# Patient Record
Sex: FEMALE | Employment: FULL TIME | URBAN - METROPOLITAN AREA
[De-identification: names, ages, dates, MRNs, and addresses within clinical notes are randomized per-mention and may not be internally consistent; named-entity substitution may affect disease eponyms.]

---

## 2017-01-26 ENCOUNTER — TELEPHONE (OUTPATIENT)
Dept: RHEUMATOLOGY | Facility: CLINIC | Age: 37
End: 2017-01-26

## 2017-01-26 NOTE — TELEPHONE ENCOUNTER
Pt states her pharmacy is requesting a prior authorization, due to change of insurance. Pt states she is new to pharmacy. Pt has apt tomorrow 1/27   Pt states she needs it asap. Please advise. Verified pharmacy.      Current outpatient prescriptions:   •

## 2017-01-26 NOTE — TELEPHONE ENCOUNTER
Please see message regarding prior authorization due to change of insurance   ENBREL SURECLICK 93LH ml SUBCUTANEOUS SOLUTION AUTO INJECTOR 50mg SUBCUTANEOUSLY ONCE A WEEK   Patient has appt. Tomorrow 1/27     Patient states she needs it asap.  Patient James Yoo

## 2017-01-27 ENCOUNTER — LAB ENCOUNTER (OUTPATIENT)
Dept: LAB | Facility: HOSPITAL | Age: 37
End: 2017-01-27
Attending: INTERNAL MEDICINE
Payer: COMMERCIAL

## 2017-01-27 ENCOUNTER — OFFICE VISIT (OUTPATIENT)
Dept: RHEUMATOLOGY | Facility: CLINIC | Age: 37
End: 2017-01-27

## 2017-01-27 VITALS
WEIGHT: 95.88 LBS | SYSTOLIC BLOOD PRESSURE: 100 MMHG | DIASTOLIC BLOOD PRESSURE: 62 MMHG | HEIGHT: 56 IN | HEART RATE: 92 BPM | BODY MASS INDEX: 21.57 KG/M2

## 2017-01-27 DIAGNOSIS — M06.9 RHEUMATOID ARTHRITIS INVOLVING MULTIPLE SITES, UNSPECIFIED RHEUMATOID FACTOR PRESENCE: ICD-10-CM

## 2017-01-27 DIAGNOSIS — M06.9 RHEUMATOID ARTHRITIS INVOLVING MULTIPLE SITES, UNSPECIFIED RHEUMATOID FACTOR PRESENCE: Primary | ICD-10-CM

## 2017-01-27 DIAGNOSIS — Z51.81 THERAPEUTIC DRUG MONITORING: ICD-10-CM

## 2017-01-27 LAB
ALT SERPL-CCNC: 14 U/L (ref 14–54)
AST SERPL-CCNC: 17 U/L (ref 15–41)
BASOPHILS # BLD: 0 K/UL (ref 0–0.2)
BASOPHILS NFR BLD: 0 %
CREAT SERPL-MCNC: 0.55 MG/DL (ref 0.5–1.5)
CRP SERPL HS-MCNC: 19.4 MG/L (ref 0–7.5)
EOSINOPHIL # BLD: 0.2 K/UL (ref 0–0.7)
EOSINOPHIL NFR BLD: 2 %
ERYTHROCYTE [DISTWIDTH] IN BLOOD BY AUTOMATED COUNT: 15.2 % (ref 11–15)
ERYTHROCYTE [SEDIMENTATION RATE] IN BLOOD: 37 MM/HR (ref 0–20)
HCT VFR BLD AUTO: 39 % (ref 35–48)
HGB BLD-MCNC: 12.6 G/DL (ref 12–16)
LYMPHOCYTES # BLD: 2.4 K/UL (ref 1–4)
LYMPHOCYTES NFR BLD: 27 %
MCH RBC QN AUTO: 28.1 PG (ref 27–32)
MCHC RBC AUTO-ENTMCNC: 32.2 G/DL (ref 32–37)
MCV RBC AUTO: 87.4 FL (ref 80–100)
MONOCYTES # BLD: 1 K/UL (ref 0–1)
MONOCYTES NFR BLD: 11 %
NEUTROPHILS # BLD AUTO: 5.3 K/UL (ref 1.8–7.7)
NEUTROPHILS NFR BLD: 60 %
PLATELET # BLD AUTO: 286 K/UL (ref 140–400)
PMV BLD AUTO: 8.1 FL (ref 7.4–10.3)
RBC # BLD AUTO: 4.47 M/UL (ref 3.7–5.4)
WBC # BLD AUTO: 8.9 K/UL (ref 4–11)

## 2017-01-27 PROCEDURE — 84450 TRANSFERASE (AST) (SGOT): CPT

## 2017-01-27 PROCEDURE — 99214 OFFICE O/P EST MOD 30 MIN: CPT | Performed by: INTERNAL MEDICINE

## 2017-01-27 PROCEDURE — 84460 ALANINE AMINO (ALT) (SGPT): CPT

## 2017-01-27 PROCEDURE — 36415 COLL VENOUS BLD VENIPUNCTURE: CPT

## 2017-01-27 PROCEDURE — 85025 COMPLETE CBC W/AUTO DIFF WBC: CPT

## 2017-01-27 PROCEDURE — 99212 OFFICE O/P EST SF 10 MIN: CPT | Performed by: INTERNAL MEDICINE

## 2017-01-27 PROCEDURE — 82565 ASSAY OF CREATININE: CPT

## 2017-01-27 PROCEDURE — 86141 C-REACTIVE PROTEIN HS: CPT

## 2017-01-27 PROCEDURE — 85652 RBC SED RATE AUTOMATED: CPT

## 2017-01-27 NOTE — TELEPHONE ENCOUNTER
Insurance in formation not updated unable to complete PA at this time. Will complete PA when pt comes to office today for F/U appointment with insurance information.

## 2017-01-27 NOTE — PATIENT INSTRUCTIONS
1. Check labs  -due every 3-4 moths   2. sulfasalazine  1500mg twice a day  3. Cont.  meloxicam 15mg  aday as needed. 4.  plaquenil - hydroxychlrquine 200mg - take 1 1/2 tablets - for your weight -   5. Stay on enbrel 50mg a week   6.   Prednisone 5mg a

## 2017-01-27 NOTE — PROGRESS NOTES
Leilani Beltran is a 29year old female who presents for Patient presents with:  Rheumatoid Arthritis  Wrist Pain: bilateral  .   HPI:     She is a pleasant 29year old who has been haviing seropositive RA - borderline pos RF and pos. CCP abs.   She is here for f feels that the palquenil is helped. But the last few weeks sheh feels it's harder to move around. Today her wrists and her hands are weaker. It's in the past few weeks. She has 8/10 pain in the last 2 weeks. Before that it went down to 5/10.    She feels sh tylenol. She is not taking ibuprofen. She is also on ssz 1500mg bid and hcq 200mg bid. She's also on enbrel 50mg a week. Greg Goetz She wants to try having children again. 1/27/2017 -   She's had ha few days where she has not had bad pain like 1/10.    She fe pleasant, no acute distress, no CAD, no neck tendnerness, good ROM,   No rashes  CVS: RRR, no murmurs  RS: CTAB, no crackles, no rhonchi  ABD: Soft Non tender,   Joint exam:   B/l 2nda dn 3rd mcps not tender - chronci fullness.    Right wrist mild  swellign C-REACTIVE PROTEIN      0.0-0.9 mg/dL 4.2 (H)   SED RATE      0-20 mm/Hr 54 (H)         ASSESSMENT AND PLAN:   Malika Santana is a 29year old female who presents for Patient presents with:  Rheumatoid Arthritis  Wrist Pain: bilateral      1.  Early seropositi

## 2017-01-30 RX ORDER — ETANERCEPT 50 MG/ML
50 SOLUTION SUBCUTANEOUS
Qty: 12 ML | Refills: 1 | Status: SHIPPED | OUTPATIENT
Start: 2017-01-30

## 2017-02-22 RX ORDER — MELOXICAM 15 MG/1
TABLET ORAL
Qty: 30 TABLET | Refills: 6 | Status: SHIPPED | OUTPATIENT
Start: 2017-02-22

## 2017-04-06 RX ORDER — PREDNISONE 1 MG/1
TABLET ORAL
Qty: 90 TABLET | Refills: 1 | Status: SHIPPED | OUTPATIENT
Start: 2017-04-06

## 2017-05-08 RX ORDER — HYDROXYCHLOROQUINE SULFATE 200 MG/1
TABLET, FILM COATED ORAL
Qty: 45 TABLET | Refills: 5 | Status: SHIPPED | OUTPATIENT
Start: 2017-05-08

## 2017-05-08 NOTE — TELEPHONE ENCOUNTER
LOV:1-27  Last Filled:10-14, #45 with 6 refills  Labs:per LOV notes was told to do eye exam every year. No eye exam noted  No future appointments.     Please Advise

## 2017-11-04 NOTE — TELEPHONE ENCOUNTER
LOV:1-27  Last Filled:10-14, #90 with 1 refill  Labs:  No future appointments.     Please Advise Alert and oriented, no focal deficits, no motor or sensory deficits.

## (undated) NOTE — MR AVS SNAPSHOT
[address] [address]  [phone]               Thank you for choosing us for your health care visit with Juan Ge MD.  We are glad to serve you and happy to provide you with this albrecht Today's Orders     AST (SGOT) [822] [Q]    Expires on:  Jun 27, 2017    Assoc Dx:  Rheumatoid arthritis involving multiple sites, unspecified rheumatoid factor presence ([facility] [M06.9], Therapeutic drug monitoring [Z51.81]           ALT (SGPT) [823] [Q]    E Visit [facility] online at  [email or web address]